# Patient Record
Sex: FEMALE | Race: WHITE | NOT HISPANIC OR LATINO
[De-identification: names, ages, dates, MRNs, and addresses within clinical notes are randomized per-mention and may not be internally consistent; named-entity substitution may affect disease eponyms.]

---

## 2020-02-05 PROBLEM — Z00.00 ENCOUNTER FOR PREVENTIVE HEALTH EXAMINATION: Status: ACTIVE | Noted: 2020-02-05

## 2020-02-07 ENCOUNTER — APPOINTMENT (OUTPATIENT)
Dept: BREAST CENTER | Facility: CLINIC | Age: 60
End: 2020-02-07
Payer: MEDICAID

## 2020-02-07 VITALS
SYSTOLIC BLOOD PRESSURE: 135 MMHG | BODY MASS INDEX: 25.11 KG/M2 | DIASTOLIC BLOOD PRESSURE: 93 MMHG | HEIGHT: 67 IN | HEART RATE: 99 BPM | WEIGHT: 160 LBS

## 2020-02-07 DIAGNOSIS — Z86.19 PERSONAL HISTORY OF OTHER INFECTIOUS AND PARASITIC DISEASES: ICD-10-CM

## 2020-02-07 DIAGNOSIS — Z82.3 FAMILY HISTORY OF STROKE: ICD-10-CM

## 2020-02-07 DIAGNOSIS — Z82.79 FAMILY HISTORY OF OTHER CONGENITAL MALFORMATIONS, DEFORMATIONS AND CHROMOSOMAL ABNORMALITIES: ICD-10-CM

## 2020-02-07 DIAGNOSIS — Z86.39 PERSONAL HISTORY OF OTHER ENDOCRINE, NUTRITIONAL AND METABOLIC DISEASE: ICD-10-CM

## 2020-02-07 DIAGNOSIS — Z82.49 FAMILY HISTORY OF ISCHEMIC HEART DISEASE AND OTHER DISEASES OF THE CIRCULATORY SYSTEM: ICD-10-CM

## 2020-02-07 PROCEDURE — 99205 OFFICE O/P NEW HI 60 MIN: CPT

## 2020-02-07 NOTE — HISTORY OF PRESENT ILLNESS
[FreeTextEntry1] : This is a 59 year old female referred by Dr. 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 This was found to be intermediate to high nuclear grade with necrosis, ER positive with moderate staining  in 85%, [place] moderate staining in 60%. This was found through stereotactic biopsy done on 1/27/2020 for a new cluster of highly suspicious microcalcifications in the left breast 5:00 spanning 1.3 CM  seen on the patients annual screening mammogram. She has no prior breast history. She presents with her daughter for consultation. Denies any trauma, changes to herbs, diet or supplements.\par \par She does SBE. \par She has not noticed a change in her breast or a breast lump.\par She has not noticed a change in her nipple or nipple area.\par She has not noticed a change in the skin of the breast.\par She is not experiencing nipple discharge.\par She is not experiencing breast pain.\par She has not noticed a lump or lymph node under the armpit. \par \par BREAST CANCER RISK FACTORS\par Menarche: 16\par Date of LMP: 2010\par Menopause: post age 49\par Grav:  3    Para: 3\par Age at first live birth: 23\par Nursed: yes \par Hysterectomy: no\par Oophorectomy: no\par OCP: no\par HRT: no\par Last pap/pelvic exam: 1/2020 abnormal pap\par Related family history: none\par Ashkenazi: no\par Mastery risk assessment: NA\par BRCA testing:  [facility] 2/5/2020\par Bra size: 32A\par \par Last mammogram: 1/2020                   Location: [facility]\par Report reviewed.                                 Images reviewed on PACS\par Results: Biradss 5\par Left breast  LOQ mid breast 1.3 CM new cluster of pleomorphic microcalcs in linear configuration. Rec stereo bx. Done 1/27/2020 Path: DCIS\par \par \par Last ultrasound:  1/2020                      Location: [facility]\par Report reviewed.                                 Images reviewed. \par Results:Birads 5\par No suspicious findings correlating to mammo findings. \par Right breast 10:00 N4 stable asymmetric fibroglandular tissue\par \par Last MRI:   2/6/2020                                          Location: [facility]\par Report reviewed.\par Left breast 3-6:00 segmental distribution spanning 5.2cm MR bx recommended anterior and inferior extent; right breast UOQ corresponding to asymmetry on mammogram there is a nonenhancing hamartoma. 8:00 there is an enhancing nodule 8mm MRbx recommended

## 2020-02-07 NOTE — PHYSICAL EXAM
[Normocephalic] : normocephalic [Supple] : supple [Atraumatic] : atraumatic [No Supraclavicular Adenopathy] : no supraclavicular adenopathy [Examined in the supine and seated position] : examined in the supine and seated position [No dominant masses] : no dominant masses in right breast  [Symmetrical] : symmetrical [No dominant masses] : no dominant masses left breast [No Nipple Retraction] : no left nipple retraction [No Nipple Discharge] : no right nipple discharge [No Axillary Lymphadenopathy] : no right axillary lymphadenopathy [No Edema] : no edema [No Ulceration] : no ulceration [No Rashes] : no rashes [de-identified] : bruising and small hematoma as expected LOQ

## 2020-02-07 NOTE — CONSULT LETTER
[Dear  ___] : Dear  [unfilled], [( Thank you for referring [unfilled] for consultation for _____ )] : Thank you for referring [unfilled] for consultation for [unfilled] [Please see my note below.] : Please see my note below. [Consult Closing:] : Thank you very much for allowing me to participate in the care of this patient.  If you have any questions, please do not hesitate to contact me. [Sincerely,] : Sincerely, [FreeTextEntry3] : Maria Del Rosario Davila MS DO\par Breast Surgeon\par [facility] \[place] [address]\par  [DrYohannes  ___] : Dr. SALAS

## 2020-02-07 NOTE — ASSESSMENT
[FreeTextEntry1] : The pathology and imaging results were discussed. The patient was advised of the early nature of this malignancy.\par \par Her MRI was reviewed with Dr. Michael and the patient. One right MR bx recommended and two left MR bx recommended.\par \par The use of multimodality therapy for the treatment of breast cancer was discussed.  \par \par Surgical options were reviewed including a lumpectomy to negative margins, with whole breast radiation therapy versus a mastectomy with or without reconstruction. \par \par  Mastectomy techniques were discussed in detail including skin sparing and nipple sparing techniques. Reconstructive options were briefly reviewed, including implant based versus tissue flap based reconstruction options.  Referral to a plastic surgeon was offered.  The patient was informed that breast reconstruction is covered by insurance per state and federal laws.   \par \par Axillary staging was discussed. [place] lymph node biopsy was recommended only in the setting of a decision to proceed with a mastectomy, a tumor highly suspicious for invasion, or a subsequent finding of invasive cancer. \par \par The general indications for the use of adjuvant hormonal therapies postoperatively were discussed for both treatment and future risk reduction. It was explained that medical treatments are dependent on a patients age and medical problems,  and their pathology results including receptor studies.  The patient was advised to meet with a medical oncologist once those results are available. \par \par The indications for radiation therapy and side effects were also discussed, including the use varying lengths of whole breast radiation.  The patient was advised to meet with a radiation therapist if a lumpectomy is done. Common side effects were reviewed.\par \par The genetics of breast cancer were discussed with the implications for risk of contralateral cancer and other associated cancers, implication for ovarian risk, options for management, and implications for family members.\par \par She met with our cancer navigator and was given a binder. Her genetic testing is pending and she will decide on her plan once she knows her status. If she elects bilateral mx then she will not have the 3 MRI bx. We will get 2nd opinion pathology at [facility]. She will be presented at [facility]. She will call us with her wishes to move forward. She will have her colposcopy with Dr. Escobedo.

## 2020-02-28 ENCOUNTER — APPOINTMENT (OUTPATIENT)
Dept: BREAST CENTER | Facility: CLINIC | Age: 60
End: 2020-02-28
Payer: MEDICAID

## 2020-02-28 VITALS
WEIGHT: 160 LBS | HEIGHT: 67 IN | BODY MASS INDEX: 25.11 KG/M2 | HEART RATE: 89 BPM | SYSTOLIC BLOOD PRESSURE: 130 MMHG | DIASTOLIC BLOOD PRESSURE: 89 MMHG

## 2020-02-28 DIAGNOSIS — R92.8 OTHER ABNORMAL AND INCONCLUSIVE FINDINGS ON DIAGNOSTIC IMAGING OF BREAST: ICD-10-CM

## 2020-02-28 PROCEDURE — 99215 OFFICE O/P EST HI 40 MIN: CPT

## 2020-03-02 PROBLEM — R92.8 ABNORMAL FINDING ON BREAST IMAGING: Status: ACTIVE | Noted: 2020-02-07

## 2020-03-02 RX ORDER — ATORVASTATIN CALCIUM 10 MG/1
10 TABLET, FILM COATED ORAL
Qty: 30 | Refills: 0 | Status: ACTIVE | COMMUNITY
Start: 2020-02-10

## 2020-03-02 RX ORDER — ERGOCALCIFEROL 1.25 MG/1
1.25 MG CAPSULE, LIQUID FILLED ORAL
Qty: 12 | Refills: 0 | Status: ACTIVE | COMMUNITY
Start: 2020-02-10

## 2020-03-02 NOTE — PHYSICAL EXAM
[Normocephalic] : normocephalic [Atraumatic] : atraumatic [No Supraclavicular Adenopathy] : no supraclavicular adenopathy [Supple] : supple [Symmetrical] : symmetrical [Examined in the supine and seated position] : examined in the supine and seated position [No dominant masses] : no dominant masses in right breast  [No dominant masses] : no dominant masses left breast [No Nipple Retraction] : no left nipple retraction [No Nipple Discharge] : no right nipple discharge [No Axillary Lymphadenopathy] : no left axillary lymphadenopathy [No Edema] : no edema [No Ulceration] : no ulceration [No Rashes] : no rashes [de-identified] : bruising and small hematoma as expected LOQ with slight puckering

## 2020-03-02 NOTE — ASSESSMENT
[FreeTextEntry1] : The pathology and imaging results were discussed. The patient was advised of the early nature of this malignancy. I discussed with them that she has multifocal DCIS and that BCT is not being recommended. She sent sample for stat [facility] Breast panel today. She is considering unilateral mastectomy and hasn't decided on reconstruction. I reviewed nipple delay given her history of smoking I am advising against nipple sparing mastectomy.\par \par The use of multimodality therapy for the treatment of breast cancer was discussed.  \par \par Surgical options were reviewed including a lumpectomy to negative margins, with whole breast radiation therapy versus a mastectomy with or without reconstruction. \par \par Mastectomy techniques were discussed in detail including skin sparing and nipple sparing techniques. Reconstructive options were briefly reviewed, including implant based versus tissue flap based reconstruction options.  Referral to a plastic surgeon was offered.  The patient was informed that breast reconstruction is covered by insurance per state and federal laws.   \par \par Axillary staging was discussed. [place] lymph node biopsy was recommended only in the setting of a decision to proceed with a mastectomy, a tumor highly suspicious for invasion, or a subsequent finding of invasive cancer. \par Given that there is a suspicion of microinvasion, SLNE is recommended. Reviewed new Mag-Trace option. She will have 2nd opinion pathology review, if no microinvasion suspected then plan for Mag Trace otherwise plan for SLNE.\par \par The general indications for the use of adjuvant hormonal therapies postoperatively were discussed for both treatment and future risk reduction. It was explained that medical treatments are dependent on a patients age and medical problems,  and their pathology results including receptor studies.  The patient was advised to meet with a medical oncologist once those results are available. \par \par We will get 2nd opinion pathology at [facility]. She will call us with her wishes to move forward. She has an appointment with Dr. Baeza next week. Results of her colposcopy with Dr. Escobedo are pending.

## 2020-03-02 NOTE — CONSULT LETTER
[Dear  ___] : Dear  [unfilled], [( Thank you for referring [unfilled] for consultation for _____ )] : Thank you for referring [unfilled] for consultation for [unfilled] [Please see my note below.] : Please see my note below. [Consult Closing:] : Thank you very much for allowing me to participate in the care of this patient.  If you have any questions, please do not hesitate to contact me. [Sincerely,] : Sincerely, [DrYohannes  ___] : Dr. SALAS [FreeTextEntry3] : Maria Del Rosario Davila MS DO\par Breast Surgeon\par [facility] \[place] [address]\par

## 2020-03-02 NOTE — HISTORY OF PRESENT ILLNESS
[FreeTextEntry1] : This is a 59 year old female referred by Dr. 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 This was found to be intermediate to high nuclear grade with necrosis, ER positive with moderate staining  in 85%, [place] moderate staining in 60%. This was found through stereotactic biopsy done on 1/27/2020 for a new cluster of highly suspicious microcalcifications in the left breast 5:00 spanning 1.3 CM  seen on the patients annual screening mammogram. She has no prior breast history. \par She is s/p right breast 7:00 MRI bx 2/17/2020 pathology showed benign fatty tissue. Left breast 6N7 MRI bx 2/17/2020 benign breast tissue and left breast 6N4 DCIS solid and focal papillary patterns, high nuclear grade, with central necrosis focal microinvasion cannot be excluded. She presents today with her daughter and Wendy for consultation.\par \par She does SBE. \par She has not noticed a change in her breast or a breast lump.\par She has not noticed a change in her nipple or nipple area.\par She has not noticed a change in the skin of the breast.\par She is not experiencing nipple discharge.\par She is not experiencing breast pain.\par She has not noticed a lump or lymph node under the armpit. \par \par BREAST CANCER RISK FACTORS\par Menarche: 16\par Date of LMP: 2010\par Menopause: post age 49\par Grav:  3    Para: 3\par Age at first live birth: 23\par Nursed: yes \par Hysterectomy: no\par Oophorectomy: no\par OCP: no\par HRT: no\par Last pap/pelvic exam: 1/2020 abnormal pap\par Related family history: none\par Ashkenazi: no\par Mastery risk assessment: NA\par BRCA testing:  [facility] 2/5/2020\par Bra size: 32A\par \par Last mammogram: 1/2020                   Location: [facility]\par Report reviewed.                                 Images reviewed on PACS\par Results: Biradss 5\par Left breast  LOQ mid breast 1.3 CM new cluster of pleomorphic microcalcs in linear configuration. Rec stereo bx. Done 1/27/2020 Path: DCIS\par \par \par Last ultrasound:  1/2020                      Location: [facility]\par Report reviewed.                                 Images reviewed. \par Results:Birads 5\par No suspicious findings correlating to mammo findings. \par Right breast 10:00 N4 stable asymmetric fibroglandular tissue\par \par Last MRI:   2/6/2020                                          Location: [facility]\par Report reviewed.\par Left breast 3-6:00 segmental distribution spanning 5.2cm MR bx recommended anterior and inferior extent; right breast UOQ corresponding to asymmetry on mammogram there is a nonenhancing hamartoma. 8:00 there is an enhancing nodule 8mm MRbx recommended

## 2020-03-02 NOTE — REASON FOR VISIT
[Consultation] : a consultation visit [Follow-Up: _____] : a [unfilled] follow-up visit [FreeTextEntry1] : Newly diagnosed DCIS left breast

## 2020-03-06 ENCOUNTER — APPOINTMENT (OUTPATIENT)
Dept: PLASTIC SURGERY | Facility: CLINIC | Age: 60
End: 2020-03-06
Payer: MEDICAID

## 2020-03-06 VITALS
DIASTOLIC BLOOD PRESSURE: 88 MMHG | WEIGHT: 161 LBS | SYSTOLIC BLOOD PRESSURE: 131 MMHG | HEIGHT: 67 IN | BODY MASS INDEX: 25.27 KG/M2 | TEMPERATURE: 98.1 F | RESPIRATION RATE: 18 BRPM | HEART RATE: 90 BPM | OXYGEN SATURATION: 99 %

## 2020-03-06 VITALS
RESPIRATION RATE: 16 BRPM | HEART RATE: 90 BPM | DIASTOLIC BLOOD PRESSURE: 90 MMHG | HEIGHT: 67 IN | BODY MASS INDEX: 25.11 KG/M2 | SYSTOLIC BLOOD PRESSURE: 135 MMHG | TEMPERATURE: 98.2 F | WEIGHT: 160 LBS

## 2020-03-06 PROCEDURE — 99204 OFFICE O/P NEW MOD 45 MIN: CPT

## 2020-03-06 NOTE — REASON FOR VISIT
[Consultation] : a consultation visit [FreeTextEntry1] : Patient with left DCIS schedueld for mastectomy with Dr. Davila presenting for a discussion of her reconstructive options

## 2020-03-06 NOTE — ASSESSMENT
[FreeTextEntry1] : A:\par Left DCIS scheduled for left mastectomy \par P:\par We discussed  immediate reconstruction with implant allograft, expander reconstruction, and no reconstruction at all.  .  Reviewed the material risks,  benefits,  and alternatives with Ms. DEREK QUINTERO  , including no surgery, and she  understands and wishes to proceed.\par

## 2020-03-06 NOTE — PHYSICAL EXAM
[NI] : Normal [de-identified] : Bilateral grade 1 ptosis, SN-N 21 cm on the left side\par 22 cm on the right side \par No palpable masses\par no adenopathy

## 2020-03-06 NOTE — HISTORY OF PRESENT ILLNESS
[FreeTextEntry1] : This is a 59 year old female referred by Dr. 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 This was found to be intermediate to high nuclear grade with necrosis, ER positive with moderate staining in 85%, [place] moderate staining in 60%. This was found through stereotactic biopsy done on 1/27/2020 for a new cluster of highly suspicious microcalcifications in the left breast 5:00 spanning 1.3 CM seen on the patients annual screening mammogram. She has no prior breast history. She presents with her daughter for consultation. Denies any trauma, changes to herbs, diet or supplements.\par \par She does SBE. \par She has not noticed a change in her breast or a breast lump.\par She has not noticed a change in her nipple or nipple area.\par She has not noticed a change in the skin of the breast.\par She is not experiencing nipple discharge.\par She is not experiencing breast pain.\par She has not noticed a lump or lymph node under the armpit. \par \par BREAST CANCER RISK FACTORS\par Menarche: 16\par Date of LMP: 2010\par Menopause: post age 49\par Grav: 3 Para: 3\par Age at first live birth: 23\par Nursed: yes \par Hysterectomy: no\par Oophorectomy: no\par OCP: no\par HRT: no\par Last pap/pelvic exam: 1/2020 abnormal pap\par Related family history: none\par Ashkenazi: no\par Mastery risk assessment: NA\par BRCA testing: [facility] 2/5/2020\par Bra size: 32A\par \par Last mammogram: 1/2020  Location: [facility]\par Report reviewed.   Images reviewed on PACS\par Results: Biradss 5\par Left breast LOQ mid breast 1.3 CM new cluster of pleomorphic microcalcs in linear configuration. Rec stereo bx. Done 1/27/2020 Path: DCIS\par \par \par Last ultrasound: 1/2020  Location: [facility]\par Report reviewed.   Images reviewed. \par Results:Birads 5\par No suspicious findings correlating to mammo findings. \par Right breast 10:00 N4 stable asymmetric fibroglandular tissue\par \par Last MRI: 2/6/2020   Location: [facility]\par Report reviewed.\par Left breast 3-6:00 segmental distribution spanning 5.2cm MR bx recommended anterior and inferior extent; right breast UOQ corresponding to asymmetry on mammogram there is a nonenhancing hamartoma. 8:00 there is an enhancing nodule 8mm MRbx recommended \par  \par Active Problems\par Abnormal finding on breast imaging (793.89) (R92.8)\par Ductal carcinoma in situ (DCIS) of left breast with comedonecrosis (233.0) (D05.12)\par \par Past Medical History \par History of HPV infection (V12.09) (Z86.19)\par History of thyroid disease (V12.29) (Z86.39)\par \par History of thyroid disease (V12.29) (Z86.39)\par     \par \par Surgical History \par History of Thyroidectomy\par \par History of Thyroidectomy\par     \par \par Family History \par Family history of No family history of breast cancer (V49.89) (Z78.9)\par    \par Family history of No family history of ovarian cancer (V49.89) (Z78.9)\par    \par Family history of lung cancer (V16.1) (Z80.1)\par    \par Family history of congenital heart disease (V19.5) (Z82.79)\par    \par Family history of hypertension (V17.49) (Z82.49)\par    \par Family history of cerebrovascular accident (CVA) (V17.1) (Z82.3)\par     \par

## 2020-03-12 ENCOUNTER — APPOINTMENT (OUTPATIENT)
Dept: BREAST CENTER | Facility: HOSPITAL | Age: 60
End: 2020-03-12
Payer: MEDICAID

## 2020-03-12 ENCOUNTER — APPOINTMENT (OUTPATIENT)
Dept: PLASTIC SURGERY | Facility: CLINIC | Age: 60
End: 2020-03-12
Payer: MEDICAID

## 2020-03-12 PROCEDURE — 38792 RA TRACER ID OF SENTINL NODE: CPT | Mod: LT,59

## 2020-03-12 PROCEDURE — 19340 INSJ BREAST IMPLT SM D MAST: CPT | Mod: LT

## 2020-03-12 PROCEDURE — 38900 IO MAP OF SENT LYMPH NODE: CPT | Mod: LT,59

## 2020-03-12 PROCEDURE — 19303 MAST SIMPLE COMPLETE: CPT | Mod: LT

## 2020-03-12 PROCEDURE — 38525 BIOPSY/REMOVAL LYMPH NODES: CPT | Mod: LT,59

## 2020-03-12 PROCEDURE — 15777 ACELLULAR DERM MATRIX IMPLT: CPT

## 2020-03-18 ENCOUNTER — APPOINTMENT (OUTPATIENT)
Dept: PLASTIC SURGERY | Facility: CLINIC | Age: 60
End: 2020-03-18
Payer: MEDICAID

## 2020-03-18 PROCEDURE — 99024 POSTOP FOLLOW-UP VISIT: CPT

## 2020-03-18 NOTE — PHYSICAL EXAM
[NI] : Normal [de-identified] : Shape and contour are good\par wounds are clean and well approximated\par flaps are viable with no collection

## 2020-03-18 NOTE — REASON FOR VISIT
[Post Op: _________] : a [unfilled] post op visit [FreeTextEntry1] : Patient status post left mastectomy reconstruction with implant and allograft.  generally feels well and drainge less than 20 cc per 8 hours from both

## 2020-03-20 ENCOUNTER — APPOINTMENT (OUTPATIENT)
Dept: BREAST CENTER | Facility: CLINIC | Age: 60
End: 2020-03-20
Payer: MEDICAID

## 2020-03-20 PROCEDURE — 99024 POSTOP FOLLOW-UP VISIT: CPT

## 2020-03-20 NOTE — PHYSICAL EXAM
[Symmetrical] : symmetrical [de-identified] : excellent cosmesis, incision c/d/i, axillary incision c/d/i, no collections

## 2020-03-20 NOTE — ASSESSMENT
[FreeTextEntry1] : doing well\par med/onc cs, she would like to see Dr. Hannah\par f/u with me 1 month\par She knows to call or return sooner should any concerns or questions arise.\par

## 2020-03-20 NOTE — HISTORY OF PRESENT ILLNESS
[FreeTextEntry1] : This is a 59 year old female referred by Dr. 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 This was found to be intermediate to high nuclear grade with necrosis, ER positive with moderate staining  in 85%, [place] moderate staining in 60%. This was found through stereotactic biopsy done on 1/27/2020 for a new cluster of highly suspicious microcalcifications in the left breast 5:00 spanning 1.3 CM  seen on the patients annual screening mammogram. She has no prior breast history. \par She is s/p right breast 7:00 MRI bx 2/17/2020 pathology showed benign fatty tissue. Left breast 6N7 MRI bx 2/17/2020 benign breast tissue and left breast 6N4 DCIS solid and focal papillary patterns, high nuclear grade, with central necrosis focal microinvasion cannot be excluded.\par \par She is s/p L SSM/SLNE with direct implant (MR) 3/12/20 pathology shows 14mm solid and cribiform high grade DCIS 5:00 and <1mm UIQ, widely negative margins, NO microinvasion, ER >75%, [place] >75%, ADH UIQ, FEA 5:00 , 0/2 slne negative, 1 axillary tail node neg total 0/3. Has no post op complaints.\par \par She does SBE. \par She has not noticed a change in her breast or a breast lump.\par She has not noticed a change in her nipple or nipple area.\par She has not noticed a change in the skin of the breast.\par She is not experiencing nipple discharge.\par She is not experiencing breast pain.\par She has not noticed a lump or lymph node under the armpit. \par \par BREAST CANCER RISK FACTORS\par Menarche: 16\par Date of LMP: 2010\par Menopause: post age 49\par Grav:  3    Para: 3\par Age at first live birth: 23\par Nursed: yes \par Hysterectomy: no\par Oophorectomy: no\par OCP: no\par HRT: no\par Last pap/pelvic exam: 1/2020 abnormal pap\par Related family history: none\par Ashkenazi: no\par Mastery risk assessment: NA\par BRCA testing:  [facility] 2/5/2020\par Bra size: 32A\par \par Last mammogram: 1/2020                   Location: [facility]\par Report reviewed.                                 Images reviewed on PACS\par Results: Biradss 5\par Left breast  LOQ mid breast 1.3 CM new cluster of pleomorphic microcalcs in linear configuration. Rec stereo bx. Done 1/27/2020 Path: DCIS\par \par \par Last ultrasound:  1/2020                      Location: [facility]\par Report reviewed.                                 Images reviewed. \par Results:Birads 5\par No suspicious findings correlating to mammo findings. \par Right breast 10:00 N4 stable asymmetric fibroglandular tissue\par \par Last MRI:   2/6/2020                                          Location: [facility]\par Report reviewed.\par Left breast 3-6:00 segmental distribution spanning 5.2cm MR bx recommended anterior and inferior extent; right breast UOQ corresponding to asymmetry on mammogram there is a nonenhancing hamartoma. 8:00 there is an enhancing nodule 8mm MRbx recommended

## 2020-04-01 ENCOUNTER — APPOINTMENT (OUTPATIENT)
Dept: PLASTIC SURGERY | Facility: CLINIC | Age: 60
End: 2020-04-01

## 2020-04-17 ENCOUNTER — APPOINTMENT (OUTPATIENT)
Dept: BREAST CENTER | Facility: CLINIC | Age: 60
End: 2020-04-17

## 2020-05-06 ENCOUNTER — APPOINTMENT (OUTPATIENT)
Dept: BREAST CENTER | Facility: CLINIC | Age: 60
End: 2020-05-06

## 2020-05-06 ENCOUNTER — APPOINTMENT (OUTPATIENT)
Dept: PLASTIC SURGERY | Facility: CLINIC | Age: 60
End: 2020-05-06
Payer: MEDICAID

## 2020-05-06 ENCOUNTER — APPOINTMENT (OUTPATIENT)
Dept: BREAST CENTER | Facility: CLINIC | Age: 60
End: 2020-05-06
Payer: MEDICAID

## 2020-05-06 VITALS — SYSTOLIC BLOOD PRESSURE: 124 MMHG | HEART RATE: 72 BPM | DIASTOLIC BLOOD PRESSURE: 68 MMHG | RESPIRATION RATE: 18 BRPM

## 2020-05-06 PROCEDURE — 99024 POSTOP FOLLOW-UP VISIT: CPT

## 2020-05-06 NOTE — ASSESSMENT
[FreeTextEntry1] : A:\par Doing well post operative \par P;\par Prineo removed\par started on Mepiform \par Clear to travel home with instructions reviewed

## 2020-05-06 NOTE — REASON FOR VISIT
[Post Op: _________] : a [unfilled] post op visit [FreeTextEntry1] : Ms. DEREK QUINTERO returns for a follow up visit, generally doing well with no complaints and no fevers or chills at home.  Doing well following left reconstruction with implant and allograft.  Happy with her result

## 2020-05-06 NOTE — PHYSICAL EXAM
4 [NI] : Normal [de-identified] : Shape and contour are good\par breast soft no contracture\par Prineo removed, incision intact

## 2020-05-06 NOTE — HISTORY OF PRESENT ILLNESS
[FreeTextEntry1] : This is a 60 year old female referred by Dr. 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 This was found to be intermediate to high nuclear grade with necrosis, ER positive with moderate staining  in 85%, [place] moderate staining in 60%. This was found through stereotactic biopsy done on 1/27/2020 for a new cluster of highly suspicious microcalcifications in the left breast 5:00 spanning 1.3 CM  seen on the patients annual screening mammogram. She has no prior breast history. \par \par She is s/p right breast 7:00 MRI bx 2/17/2020 pathology showed benign fatty tissue. Left breast 6N7 MRI bx 2/17/2020 benign breast tissue and left breast 6N4 DCIS solid and focal papillary patterns, high nuclear grade, with central necrosis focal microinvasion cannot be excluded.\par \par She is s/p L SSM/SLNE with direct implant (MR) 3/12/20 pathology shows 14mm solid and cribiform high grade DCIS 5:00 and <1mm UIQ, widely negative margins, NO microinvasion, ER >75%, [place] >75%, ADH UIQ, FEA 5:00 , 0/2 slne negative, 1 axillary tail node neg total 0/3. Has no post op complaints.\par She started anastrozole with Dr. Hannah and is tolerating it well. She is traveling back to [facility] next week and will [place] back in the [place] NOV/DEC 2020.\par \par She does SBE. \par She has not noticed a change in her breast or a breast lump.\par She has not noticed a change in her nipple or nipple area.\par She has not noticed a change in the skin of the breast.\par She is not experiencing nipple discharge.\par She is not experiencing breast pain.\par She has not noticed a lump or lymph node under the armpit. \par \par BREAST CANCER RISK FACTORS\par Menarche: 16\par Date of LMP: 2010\par Menopause: post age 49\par Grav:  3    Para: 3\par Age at first live birth: 23\par Nursed: yes \par Hysterectomy: no\par Oophorectomy: no\par OCP: no\par HRT: no\par Last pap/pelvic exam: 1/2020 abnormal pap\par Related family history: none\par Ashkenazi: no\par Mastery risk assessment: NA\par BRCA testing:  [facility] 2/5/2020\par Bra size: 32A\par \par Last mammogram: 1/2020                   Location: [facility]\par Report reviewed.                                 Images reviewed on PACS\par Results: Biradss 5\par Left breast  LOQ mid breast 1.3 CM new cluster of pleomorphic microcalcs in linear configuration. Rec stereo bx. Done 1/27/2020 Path: DCIS\par \par \par Last ultrasound:  1/2020                      Location: [facility]\par Report reviewed.                                 Images reviewed. \par Results:Birads 5\par No suspicious findings correlating to mammo findings. \par Right breast 10:00 N4 stable asymmetric fibroglandular tissue\par \par Last MRI:   2/6/2020                                          Location: [facility]\par Report reviewed.\par Left breast 3-6:00 segmental distribution spanning 5.2cm MR bx recommended anterior and inferior extent; right breast UOQ corresponding to asymmetry on mammogram there is a nonenhancing hamartoma. 8:00 there is an enhancing nodule 8mm MRbx recommended

## 2020-05-06 NOTE — ASSESSMENT
[FreeTextEntry1] : 61 yo female with left breast DCIS, ER+, ADH\par cont anastrazole and surveillance per Dr. Hannah\par plan right mg/junaido JAN 2021\par f[place]\par She knows to call or return sooner should any concerns or questions arise.\par \par

## 2020-05-06 NOTE — PHYSICAL EXAM
[Symmetrical] : symmetrical [de-identified] : excellent cosmesis, incision c/d/i, axillary incision c/d/i, no collections, implant soft in place, there is a small sebaceous cyst LIQ left reconstructed breast

## 2021-01-22 ENCOUNTER — APPOINTMENT (OUTPATIENT)
Dept: BREAST CENTER | Facility: CLINIC | Age: 61
End: 2021-01-22
Payer: MEDICAID

## 2021-01-22 VITALS
HEIGHT: 67 IN | SYSTOLIC BLOOD PRESSURE: 136 MMHG | BODY MASS INDEX: 25.11 KG/M2 | DIASTOLIC BLOOD PRESSURE: 93 MMHG | HEART RATE: 87 BPM | WEIGHT: 160 LBS

## 2021-01-22 PROCEDURE — 99072 ADDL SUPL MATRL&STAF TM PHE: CPT

## 2021-01-22 PROCEDURE — 99215 OFFICE O/P EST HI 40 MIN: CPT

## 2021-01-22 NOTE — PHYSICAL EXAM
[Symmetrical] : symmetrical [de-identified] : excellent cosmesis, incision c/d/i, axillary incision c/d/i, no collections, implant soft in place, there is a small sebaceous cyst LIQ left reconstructed breast

## 2021-01-22 NOTE — ASSESSMENT
[FreeTextEntry1] : 61 yo female with left breast DCIS, ER+, ADH\par cont anastrazole and surveillance per Dr. Hannah\par plan right mg/sono JAN 2022\par MRI MAY 2021\par We reviewed risk reduction strategies including maintaining a BMI <25, limiting red meat intake and alcoholic beverages to 3 per week and exercise (150 min/ week low intensity or 75 min/week high intensity). And maintaining a normal vitamin D level.\par \par f/u after MRI\par She knows to call or return sooner should any concerns or questions arise.\par \par

## 2021-01-22 NOTE — HISTORY OF PRESENT ILLNESS
[FreeTextEntry1] : This is a 60 year old female referred by Dr. 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 This was found to be intermediate to high nuclear grade with necrosis, ER positive with moderate staining  in 85%, [place] moderate staining in 60%. This was found through stereotactic biopsy done on 1/27/2020 for a new cluster of highly suspicious microcalcifications in the left breast 5:00 spanning 1.3 CM  seen on the patients annual screening mammogram. She has no prior breast history. \par \par She is s/p right breast 7:00 MRI bx 2/17/2020 pathology showed benign fatty tissue. Left breast 6N7 MRI bx 2/17/2020 benign breast tissue and left breast 6N4 DCIS solid and focal papillary patterns, high nuclear grade, with central necrosis focal microinvasion cannot be excluded.\par \par She is s/p L SSM/SLNE with direct implant (MR) 3/12/20 pathology shows 14mm solid and cribiform high grade DCIS 5:00 and <1mm UIQ, widely negative margins, NO microinvasion, ER >75%, [place] >75%, ADH UIQ, FEA 5:00 , 0/2 slne negative, 1 axillary tail node neg total 0/3. Has no post op complaints.\par She started anastrozole with Dr. Hannah and is tolerating it well. She is traveling back to [facility] JULY 2021.\par \par She does SBE. \par She has not noticed a change in her breast or a breast lump.\par She has not noticed a change in her nipple or nipple area.\par She has not noticed a change in the skin of the breast.\par She is not experiencing nipple discharge.\par She is not experiencing breast pain.\par She has not noticed a lump or lymph node under the armpit. \par \par BREAST CANCER RISK FACTORS\par Menarche: 16\par Date of LMP: 2010\par Menopause: post age 49\par Grav:  3    Para: 3\par Age at first live birth: 23\par Nursed: yes \par Hysterectomy: no\par Oophorectomy: no\par OCP: no\par HRT: no\par Last pap/pelvic exam: 1/2020 abnormal pap\par Related family history: none\par Ashkenazi: no\par Mastery risk assessment: NA\par BRCA testing:  [facility] 2/5/2020\par Bra size: 32A\par \par Last mammogram: 1/19/21 right          Location: [place]\par Report reviewed.                                 Images reviewed on PACS\par Results: Birads 2\par dense breast with no evidence of malignancy.\par \par Last ultrasound:  1/21/21 right           Location: [place]\par Report reviewed.                                 Images reviewed. \par Results:Birads 2\par no evidence of malignancy. \par \par Last MRI:   2/6/2020                                          Location: [facility]\par Report reviewed.\par Left breast 3-6:00 segmental distribution spanning 5.2cm MR bx recommended anterior and inferior extent; right breast UOQ corresponding to asymmetry on mammogram there is a nonenhancing hamartoma. 8:00 there is an enhancing nodule 8mm MRbx recommended
intact

## 2021-02-10 ENCOUNTER — NON-APPOINTMENT (OUTPATIENT)
Age: 61
End: 2021-02-10

## 2021-11-15 ENCOUNTER — APPOINTMENT (OUTPATIENT)
Dept: BREAST CENTER | Facility: CLINIC | Age: 61
End: 2021-11-15
Payer: MEDICAID

## 2021-11-15 ENCOUNTER — NON-APPOINTMENT (OUTPATIENT)
Age: 61
End: 2021-11-15

## 2021-11-15 VITALS
WEIGHT: 160 LBS | DIASTOLIC BLOOD PRESSURE: 89 MMHG | BODY MASS INDEX: 25.11 KG/M2 | HEIGHT: 67 IN | SYSTOLIC BLOOD PRESSURE: 140 MMHG | HEART RATE: 77 BPM

## 2021-11-15 DIAGNOSIS — Z80.1 FAMILY HISTORY OF MALIGNANT NEOPLASM OF TRACHEA, BRONCHUS AND LUNG: ICD-10-CM

## 2021-11-15 PROCEDURE — 99214 OFFICE O/P EST MOD 30 MIN: CPT

## 2021-11-15 NOTE — PHYSICAL EXAM
[Symmetrical] : symmetrical [de-identified] : excellent cosmesis, incision c/d/i, axillary incision c/d/i, no collections, implant soft in place, there is a small sebaceous cyst LIQ left reconstructed breast [de-identified] : right nipple with small mole lateral 1mm

## 2021-11-15 NOTE — ASSESSMENT
[FreeTextEntry1] : 62 yo female with left breast DCIS, ER+, ADH\par cont anastrazole and surveillance per Dr. Hannah\par plan right mg/sono JAN 2022\par MRI NOV 2022\par We reviewed risk reduction strategies including maintaining a BMI <25, limiting red meat intake and alcoholic beverages to 3 per week and exercise (150 min/ week low intensity or 75 min/week high intensity). And maintaining a normal vitamin D level.\par \par f/u after MRI\par She knows to call or return sooner should any concerns or questions arise.\par \par translation via Zef [id number]#

## 2021-11-15 NOTE — HISTORY OF PRESENT ILLNESS
[FreeTextEntry1] : This is a 61 year old female referred by Dr. 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 This was found to be intermediate to high nuclear grade with necrosis, ER positive with moderate staining  in 85%, [place] moderate staining in 60%. This was found through stereotactic biopsy done on 1/27/2020 for a new cluster of highly suspicious microcalcifications in the left breast 5:00 spanning 1.3 CM  seen on the patients annual screening mammogram. She has no prior breast history. \par \par She is s/p right breast 7:00 MRI bx 2/17/2020 pathology showed benign fatty tissue. Left breast 6N7 MRI bx 2/17/2020 benign breast tissue and left breast 6N4 DCIS solid and focal papillary patterns, high nuclear grade, with central necrosis focal microinvasion cannot be excluded.\par \par She is s/p L SSM/SLNE with direct implant (MR) 3/12/20 pathology shows 14mm solid and cribiform high grade DCIS 5:00 and <1mm UIQ, widely negative margins, NO microinvasion, ER >75%, [place] >75%, ADH UIQ, FEA 5:00 , 0/2 slne negative, 1 axillary tail node neg total 0/3. Has no post op complaints.\par She started anastrozole with Dr. Hannah and is tolerating it well. She is traveling back to [facility] May 2022.\par She is s/p right MRI bx 2/18/2021 pathology showed benign changes.\par \par She does SBE. \par She has not noticed a change in her breast or a breast lump.\par She has not noticed a change in her nipple or nipple area.\par She has not noticed a change in the skin of the breast.\par She is not experiencing nipple discharge.\par She is not experiencing breast pain.\par She has not noticed a lump or lymph node under the armpit. \par \par BREAST CANCER RISK FACTORS\par Menarche: 16\par Date of LMP: 2010\par Menopause: post age 49\par Grav:  3    Para: 3\par Age at first live birth: 23\par Nursed: yes \par Hysterectomy: no\par Oophorectomy: no\par OCP: no\par HRT: no\par Last pap/pelvic exam: 1/2020 abnormal pap\par Related family history: none\par Ashkenazi: no\par Mastery risk assessment: NA\par BRCA testing:  3/3/2020 negative \par Bra size: 32A\par \par Last mammogram: 1/19/2021 right          Location: [place]\par Report reviewed.                                 Images reviewed on PACS\par Results: Birads 2\par dense breast with no evidence of malignancy.\par \par Last ultrasound:  1/19/2021 right           Location: [place]\par Report reviewed.                                 Images reviewed. \par Results:Birads 2\par no evidence of malignancy. \par \par Last MRI:   11/1/2021                                          Location: [place]\par Report reviewed.\par Results: BIRADS 3\par stable enhancing focus at the right 3:00 retroareolar location dating back to 2/2020. Susceptibility artifact is noted at the right 6:00 axis. Prev biopsied benign enhancement in this location is less apparent when compared to prior examination. No MRI evidence of malignancy, left reconstructed breast. Intact implant in the left recon breast.

## 2023-02-14 ENCOUNTER — RESULT REVIEW (OUTPATIENT)
Age: 63
End: 2023-02-14

## 2023-02-17 ENCOUNTER — APPOINTMENT (OUTPATIENT)
Dept: BREAST CENTER | Facility: CLINIC | Age: 63
End: 2023-02-17
Payer: MEDICAID

## 2023-02-17 ENCOUNTER — NON-APPOINTMENT (OUTPATIENT)
Age: 63
End: 2023-02-17

## 2023-02-17 VITALS
BODY MASS INDEX: 25.11 KG/M2 | WEIGHT: 160 LBS | SYSTOLIC BLOOD PRESSURE: 129 MMHG | DIASTOLIC BLOOD PRESSURE: 90 MMHG | HEIGHT: 67 IN | HEART RATE: 86 BPM

## 2023-02-17 PROCEDURE — 99214 OFFICE O/P EST MOD 30 MIN: CPT

## 2023-02-17 NOTE — ASSESSMENT
[FreeTextEntry1] : 61 yo female with left breast DCIS, ER+, ADH\par cont anastrazole and surveillance per Dr. Hannah\par plan right mg/sono Feb 2024\par MRI Aug 2023--but she will not be in the [facility] so will get this done NOV 2023\par We reviewed risk reduction strategies including maintaining a BMI <25, limiting red meat intake and alcoholic beverages to 3 per week and exercise (150 min/ week low intensity or 75 min/week high intensity). And maintaining a normal vitamin D level.\par rec she see pcp for physical and gyn for annual visit\par f/u 1 year as long as imaging negative\par She knows to call or return sooner should any concerns or questions arise.\par \par translation via Marissa #[id number]

## 2023-02-17 NOTE — PHYSICAL EXAM
[Symmetrical] : symmetrical [de-identified] : excellent cosmesis, incision c/d/i, axillary incision c/d/i, no collections, implant soft in place, there is a small sebaceous cyst LIQ left reconstructed breast [de-identified] : right nipple with small mole lateral 1mm

## 2023-02-17 NOTE — CONSULT LETTER
[Dear  ___] : Dear  [unfilled], [Courtesy Letter:] : I had the pleasure of seeing your patient, [unfilled], in my office today. [Please see my note below.] : Please see my note below. [Consult Closing:] : Thank you very much for allowing me to participate in the care of this patient.  If you have any questions, please do not hesitate to contact me. [Sincerely,] : Sincerely, [FreeTextEntry1] : She is doing well.  [FreeTextEntry3] : Maria Del Rosario Davila MS DO\par Breast Surgeon\par [facility] \[place] [address]\par

## 2023-02-17 NOTE — HISTORY OF PRESENT ILLNESS
[FreeTextEntry1] : This is a 62 year old female referred by Dr. 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 This was found to be intermediate to high nuclear grade with necrosis, ER positive with moderate staining  in 85%, [place] moderate staining in 60%. This was found through stereotactic biopsy done on 1/27/2020 for a new cluster of highly suspicious microcalcifications in the left breast 5:00 spanning 1.3 CM  seen on the patients annual screening mammogram. She has no prior breast history. \par \par She is s/p right breast 7:00 MRI bx 2/17/2020 pathology showed benign fatty tissue. Left breast 6N7 MRI bx 2/17/2020 benign breast tissue and left breast 6N4 DCIS solid and focal papillary patterns, high nuclear grade, with central necrosis focal microinvasion cannot be excluded.\par \par She is s/p L SSM/SLNE with direct implant (MR) 3/12/20 pathology shows 14mm solid and cribiform high grade DCIS 5:00 and <1mm UIQ, widely negative margins, NO microinvasion, ER >75%, [place] >75%, ADH UIQ, FEA 5:00 , 0/2 slne negative, 1 axillary tail node neg total 0/3. Has no post op complaints.\par She started anastrozole with Dr. Hannah and is tolerating it well. She is traveling back to [facility] May 2022.\par She is s/p right MRI bx 2/18/2021 pathology showed benign changes.\par She is doing well and is now a [place] citizen.\par \par She does SBE. \par She has not noticed a change in her breast or a breast lump.\par She has not noticed a change in her nipple or nipple area.\par She has not noticed a change in the skin of the breast.\par She is not experiencing nipple discharge.\par She is not experiencing breast pain.\par She has not noticed a lump or lymph node under the armpit. \par \par BREAST CANCER RISK FACTORS\par Menarche: 16\par Date of LMP: 2010\par Menopause: post age 49\par Grav:  3    Para: 3\par Age at first live birth: 23\par Nursed: yes \par Hysterectomy: no\par Oophorectomy: no\par OCP: no\par HRT: no\par Last pap/pelvic exam: 1/2020 abnormal pap\par Related family history: none\par Ashkenazi: no\par Mastery risk assessment: NA\par BRCA testing:  3/3/2020 negative \par Bra size: 32A\par \par Last mammogram: 2/15/2023 right        Location: [place]\par Report reviewed.                                 Images reviewed on PACS\par Results: Birads 2\par dense breasts with no evidence of malignancy.\par \par Last ultrasound:  2/15/2023 right           Location: [place]\par Report reviewed.                                 Images reviewed. \par Results:Birads 2\par no evidence of malignancy. \par \par Last MRI:   5/25/2022                                          Location: [place]\par Report reviewed.\par Results: BIRADS 2\par No MRI findings suspicious for malignancy. Stable right 3:00 retroareolar enhancing focus consistent with benign lesion. No change to suggest malignancy in either breast.

## 2023-11-09 ENCOUNTER — TRANSCRIPTION ENCOUNTER (OUTPATIENT)
Age: 63
End: 2023-11-09

## 2023-11-13 ENCOUNTER — APPOINTMENT (OUTPATIENT)
Dept: BREAST CENTER | Facility: CLINIC | Age: 63
End: 2023-11-13
Payer: MEDICAID

## 2023-11-13 VITALS
BODY MASS INDEX: 25.11 KG/M2 | DIASTOLIC BLOOD PRESSURE: 79 MMHG | WEIGHT: 160 LBS | SYSTOLIC BLOOD PRESSURE: 118 MMHG | HEIGHT: 67 IN | HEART RATE: 100 BPM

## 2023-11-13 DIAGNOSIS — F17.200 NICOTINE DEPENDENCE, UNSPECIFIED, UNCOMPLICATED: ICD-10-CM

## 2023-11-13 PROCEDURE — 99215 OFFICE O/P EST HI 40 MIN: CPT

## 2023-11-13 RX ORDER — METHENAMINE HIPPURATE 1 G/1
1 TABLET ORAL
Qty: 60 | Refills: 0 | Status: ACTIVE | COMMUNITY
Start: 2023-02-07

## 2023-11-13 RX ORDER — ANASTROZOLE TABLETS 1 MG/1
1 TABLET ORAL
Qty: 30 | Refills: 0 | Status: ACTIVE | COMMUNITY
Start: 2023-01-13

## 2023-11-13 RX ORDER — AMLODIPINE BESYLATE 5 MG/1
5 TABLET ORAL
Qty: 90 | Refills: 0 | Status: ACTIVE | COMMUNITY
Start: 2023-11-03

## 2023-11-13 RX ORDER — ALENDRONATE SODIUM 70 MG/1
70 TABLET ORAL
Qty: 12 | Refills: 0 | Status: ACTIVE | COMMUNITY
Start: 2023-09-28

## 2023-11-13 RX ORDER — MECLIZINE HYDROCHLORIDE 25 MG/1
25 TABLET ORAL
Qty: 15 | Refills: 0 | Status: ACTIVE | COMMUNITY
Start: 2023-11-03

## 2024-02-15 ENCOUNTER — RESULT REVIEW (OUTPATIENT)
Age: 64
End: 2024-02-15

## 2024-02-21 ENCOUNTER — NON-APPOINTMENT (OUTPATIENT)
Age: 64
End: 2024-02-21

## 2024-03-13 ENCOUNTER — RESULT REVIEW (OUTPATIENT)
Age: 64
End: 2024-03-13

## 2024-03-15 ENCOUNTER — APPOINTMENT (OUTPATIENT)
Dept: BREAST CENTER | Facility: CLINIC | Age: 64
End: 2024-03-15
Payer: MEDICAID

## 2024-03-15 VITALS
HEIGHT: 67 IN | DIASTOLIC BLOOD PRESSURE: 91 MMHG | WEIGHT: 160 LBS | HEART RATE: 77 BPM | SYSTOLIC BLOOD PRESSURE: 137 MMHG | BODY MASS INDEX: 25.11 KG/M2

## 2024-03-15 DIAGNOSIS — D05.12 INTRADUCTAL CARCINOMA IN SITU OF LEFT BREAST: ICD-10-CM

## 2024-03-15 DIAGNOSIS — R92.30 DENSE BREASTS, UNSPECIFIED: ICD-10-CM

## 2024-03-15 DIAGNOSIS — Z85.3 PERSONAL HISTORY OF MALIGNANT NEOPLASM OF BREAST: ICD-10-CM

## 2024-03-15 DIAGNOSIS — Z78.9 OTHER SPECIFIED HEALTH STATUS: ICD-10-CM

## 2024-03-15 DIAGNOSIS — Z12.31 ENCOUNTER FOR SCREENING MAMMOGRAM FOR MALIGNANT NEOPLASM OF BREAST: ICD-10-CM

## 2024-03-15 DIAGNOSIS — N60.19 DIFFUSE CYSTIC MASTOPATHY OF UNSPECIFIED BREAST: ICD-10-CM

## 2024-03-15 PROCEDURE — 99214 OFFICE O/P EST MOD 30 MIN: CPT

## 2024-03-15 RX ORDER — SULFAMETHOXAZOLE AND TRIMETHOPRIM 800; 160 MG/1; MG/1
800-160 TABLET ORAL
Qty: 10 | Refills: 0 | Status: DISCONTINUED | COMMUNITY
Start: 2019-11-22 | End: 2024-03-15

## 2024-03-15 RX ORDER — CIPROFLOXACIN HYDROCHLORIDE 250 MG/1
250 TABLET, FILM COATED ORAL
Qty: 20 | Refills: 0 | Status: DISCONTINUED | COMMUNITY
Start: 2020-02-06 | End: 2024-03-15

## 2024-03-15 NOTE — ASSESSMENT
[FreeTextEntry1] : 62 yo female with left breast DCIS, ER+, ADH  cont anastrazole and surveillance per Dr. Hannah  plan right mg/MRI Feb 2025  We reviewed risk reduction strategies including maintaining a BMI <25, limiting red meat intake and alcoholic beverages to 3 per week and exercise (150 min/ week low intensity or 75 min/week high intensity). And maintaining a normal vitamin D level.  She knows to call or return sooner should any concerns or questions arise.  translation via Bryon #[id number]

## 2024-03-15 NOTE — CONSULT LETTER
[Dear  ___] : Dear  [unfilled], [Courtesy Letter:] : I had the pleasure of seeing your patient, [unfilled], in my office today. [Consult Closing:] : Thank you very much for allowing me to participate in the care of this patient.  If you have any questions, please do not hesitate to contact me. [Please see my note below.] : Please see my note below. [Sincerely,] : Sincerely, [FreeTextEntry3] : Maria Del Rosario Davila MS DO\par  Breast Surgeon\par  [facility] \[place] [address]\par   [FreeTextEntry1] : She is doing well.  [DrYohannes  ___] : Dr. SALAS

## 2024-03-15 NOTE — HISTORY OF PRESENT ILLNESS
[FreeTextEntry1] : This is a 63 year old female referred by Dr. 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 This was found to be intermediate to high nuclear grade with necrosis, ER positive with moderate staining  in 85%, [place] moderate staining in 60%. This was found through stereotactic biopsy done on 1/27/2020 for a new cluster of highly suspicious microcalcifications in the left breast 5:00 spanning 1.3 CM  seen on the patients annual screening mammogram. She has no prior breast history.   She is s/p right breast 7:00 MRI bx 2/17/2020 pathology showed benign fatty tissue. Left breast 6N7 MRI bx 2/17/2020 benign breast tissue and left breast 6N4 DCIS solid and focal papillary patterns, high nuclear grade, with central necrosis focal microinvasion cannot be excluded.  She is s/p L SSM/SLNE with direct implant (MR) 3/12/20 pathology shows 14mm solid and cribiform high grade DCIS 5:00 and <1mm UIQ, widely negative margins, NO microinvasion, ER >75%, [place] >75%, ADH UIQ, FEA 5:00 , 0/2 slne negative, 1 axillary tail node neg total 0/3. Has no post op complaints. She started anastrozole with Dr. Hannah and is tolerating it well. She is traveling back to [facility] May 2022. She is s/p right MRI bx 2/18/2021 pathology showed benign changes. She is doing well and is now a [place] citizen.  she felt back pain for a few days and went to ER upon imaging MRI was concerning for metastatic dz at L2, L3, L4. She saw Dr Hannah and bone scan and PET scan were ordered and EOD work up was negative.  She does SBE.  She has not noticed a change in her breast or a breast lump. She has not noticed a change in her nipple or nipple area. She has not noticed a change in the skin of the breast. She is not experiencing nipple discharge. She is not experiencing breast pain. She has not noticed a lump or lymph node under the armpit.   BREAST CANCER RISK FACTORS Menarche: 16 Date of LMP: 2010 Menopause: post age 49 Grav:  3    Para: 3 Age at first live birth: 23 Nursed: yes  Hysterectomy: no Oophorectomy: no OCP: no HRT: no Last pap/pelvic exam: 2024 WNL  Related family history: brother lung cancer @70's Ashkenazi: no Mastery risk assessment: NA BRCA testing:  3/3/2020 negative  Bra size: 32A  Last mammogram: 2/16/2024 right        Location: [place] Report reviewed.                                 Images reviewed on PACS Results: Birads 2 dense breasts with no evidence of malignancy.  Last ultrasound:  02/16/2024 right           Location: [place] Report reviewed.                                 Images reviewed.  Results:Birads 2 no evidence of malignancy.   Last MRI:  11/8/2023                                     Location: [place] Report reviewed. Results: BIRADS 2 No MRI findings suspicious for malignancy. Stable right 3:00 retroareolar enhancing focus consistent with benign lesion. No change to suggest malignancy in either breast.

## 2024-03-15 NOTE — PHYSICAL EXAM
[Symmetrical] : symmetrical [No dominant masses] : no dominant masses in right breast  [No dominant masses] : no dominant masses left breast [No Nipple Retraction] : no left nipple retraction [No Nipple Discharge] : no left nipple discharge [Normocephalic] : normocephalic [Atraumatic] : atraumatic [Supple] : supple [No Supraclavicular Adenopathy] : no supraclavicular adenopathy [Examined in the supine and seated position] : examined in the supine and seated position [No Axillary Lymphadenopathy] : no left axillary lymphadenopathy [No Edema] : no edema [No Rashes] : no rashes [No Ulceration] : no ulceration [de-identified] : excellent cosmesis, incision c/d/i, axillary incision c/d/i, no collections, implant soft in place, there is a small sebaceous cyst LIQ left reconstructed breast [de-identified] : right nipple with small mole lateral 1mm

## 2025-02-27 ENCOUNTER — NON-APPOINTMENT (OUTPATIENT)
Age: 65
End: 2025-02-27

## 2025-03-04 ENCOUNTER — RESULT REVIEW (OUTPATIENT)
Age: 65
End: 2025-03-04

## 2025-03-07 ENCOUNTER — APPOINTMENT (OUTPATIENT)
Dept: BREAST CENTER | Facility: CLINIC | Age: 65
End: 2025-03-07
Payer: MEDICAID

## 2025-03-07 VITALS
HEIGHT: 67 IN | SYSTOLIC BLOOD PRESSURE: 128 MMHG | HEART RATE: 78 BPM | WEIGHT: 160 LBS | BODY MASS INDEX: 25.11 KG/M2 | DIASTOLIC BLOOD PRESSURE: 80 MMHG

## 2025-03-07 DIAGNOSIS — Z12.31 ENCOUNTER FOR SCREENING MAMMOGRAM FOR MALIGNANT NEOPLASM OF BREAST: ICD-10-CM

## 2025-03-07 DIAGNOSIS — D05.12 INTRADUCTAL CARCINOMA IN SITU OF LEFT BREAST: ICD-10-CM

## 2025-03-07 DIAGNOSIS — R92.30 DENSE BREASTS, UNSPECIFIED: ICD-10-CM

## 2025-03-07 PROCEDURE — 99214 OFFICE O/P EST MOD 30 MIN: CPT
